# Patient Record
Sex: FEMALE | Race: WHITE | NOT HISPANIC OR LATINO | Employment: FULL TIME | URBAN - METROPOLITAN AREA
[De-identification: names, ages, dates, MRNs, and addresses within clinical notes are randomized per-mention and may not be internally consistent; named-entity substitution may affect disease eponyms.]

---

## 2020-06-10 ENCOUNTER — HISTORICAL (OUTPATIENT)
Dept: RADIOLOGY | Facility: HOSPITAL | Age: 60
End: 2020-06-10

## 2020-11-10 ENCOUNTER — HISTORICAL (OUTPATIENT)
Dept: RADIOLOGY | Facility: HOSPITAL | Age: 60
End: 2020-11-10

## 2021-06-23 ENCOUNTER — HISTORICAL (OUTPATIENT)
Dept: RADIOLOGY | Facility: HOSPITAL | Age: 61
End: 2021-06-23

## 2022-04-10 ENCOUNTER — HISTORICAL (OUTPATIENT)
Dept: ADMINISTRATIVE | Facility: HOSPITAL | Age: 62
End: 2022-04-10

## 2022-04-29 VITALS
BODY MASS INDEX: 34.93 KG/M2 | HEIGHT: 66 IN | SYSTOLIC BLOOD PRESSURE: 130 MMHG | WEIGHT: 217.38 LBS | DIASTOLIC BLOOD PRESSURE: 82 MMHG

## 2023-07-24 PROBLEM — G47.00 INSOMNIA: Status: ACTIVE | Noted: 2023-07-24

## 2023-07-24 PROBLEM — E78.00 HYPERCHOLESTEROLEMIA: Status: ACTIVE | Noted: 2023-07-24

## 2023-07-24 PROBLEM — Z00.00 WELLNESS EXAMINATION: Status: ACTIVE | Noted: 2023-07-24

## 2023-10-15 PROBLEM — Z23 IMMUNIZATION DUE: Status: ACTIVE | Noted: 2023-10-15

## 2023-10-15 PROBLEM — Z87.891 FORMER SMOKER: Status: ACTIVE | Noted: 2023-10-15

## 2023-10-15 PROBLEM — Z82.49 FAMILY HISTORY OF ISCHEMIC HEART DISEASE: Status: ACTIVE | Noted: 2023-10-15

## 2023-10-16 PROCEDURE — 87389 HIV-1 AG W/HIV-1&-2 AB AG IA: CPT | Performed by: FAMILY MEDICINE

## 2023-10-16 PROCEDURE — 86803 HEPATITIS C AB TEST: CPT | Performed by: FAMILY MEDICINE

## 2023-11-07 ENCOUNTER — HOSPITAL ENCOUNTER (OUTPATIENT)
Dept: RADIOLOGY | Facility: HOSPITAL | Age: 63
Discharge: HOME | End: 2023-11-07
Attending: FAMILY MEDICINE
Payer: COMMERCIAL

## 2023-11-07 DIAGNOSIS — Z87.891 FORMER SMOKER: ICD-10-CM

## 2023-11-07 DIAGNOSIS — Z12.2 SCREENING FOR LUNG CANCER: ICD-10-CM

## 2023-11-07 PROCEDURE — 71271 CT THORAX LUNG CANCER SCR C-: CPT | Mod: TC

## 2023-11-07 NOTE — PROGRESS NOTES
Please notify patient that her LDCT of lung showed no signs of lung cancer.  It Did show an enlarged liver, often due to fatty liver and treatment is typically avoiding alcohol and working on weight loss.  Recommend repeating LD CT of lung annually for lung cancer screening.  We can set up again at her next CPX.

## 2024-01-15 PROBLEM — Z00.00 WELLNESS EXAMINATION: Status: RESOLVED | Noted: 2023-07-24 | Resolved: 2024-01-15
